# Patient Record
Sex: FEMALE | Race: WHITE
[De-identification: names, ages, dates, MRNs, and addresses within clinical notes are randomized per-mention and may not be internally consistent; named-entity substitution may affect disease eponyms.]

---

## 2019-09-04 ENCOUNTER — HOSPITAL ENCOUNTER (EMERGENCY)
Dept: HOSPITAL 59 - ER | Age: 44
Discharge: HOME | End: 2019-09-04
Payer: SELF-PAY
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
## 2019-09-04 DIAGNOSIS — S61.214A: Primary | ICD-10-CM

## 2019-09-04 PROCEDURE — 90715 TDAP VACCINE 7 YRS/> IM: CPT

## 2019-09-04 PROCEDURE — 73140 X-RAY EXAM OF FINGER(S): CPT

## 2019-09-04 PROCEDURE — 96372 THER/PROPH/DIAG INJ SC/IM: CPT

## 2019-09-04 PROCEDURE — 99283 EMERGENCY DEPT VISIT LOW MDM: CPT

## 2019-09-04 NOTE — EMERGENCY DEPARTMENT RECORD
History of Present Illness





- General


Chief Complaint: Animal Bite


Stated Complaint: DOG BITE RING FINGER/WC


Time Seen by Provider: 09/04/19 19:32


Source: Patient


Mode of Arrival: Ambulatory


Limitations: No limitations





- History of Present Illness


Initial Comments: 





Pt is right hand dominate with dog bite to ring finger of right hand at work as 

.  Last Td is unknown.  Small laceration to pad of right ring 

finger. 


No other injury.  


Onset/Timing: 3


-: Hour(s)


Location - General: Other


Animal: Dog


Description: Household pet


Mechanism: Bite


Pain Description: Constant


Context: Other


Associated Symptoms: Bleeding, Discharge from wound


Treatments Prior to Arrival: Wound dressing(s)





- Related Data


                                  Previous Rx's











 Medication  Instructions  Recorded


 


Cephalexin [Keflex] 1,000 mg PO BID 5 Days #20 cap 09/04/19











                                    Allergies











Allergy/AdvReac Type Severity Reaction Status Date / Time


 


amoxicillin Allergy  PT UNSURE Verified 10/02/15 11:25





   OF REACTION  


 


clarithromycin [From Biaxin] Allergy  RASH Verified 10/02/15 11:25


 


erythromycin base Allergy  PT UNSURE Verified 10/02/15 11:25





   OF REACTION  


 


Penicillins Allergy  PT UNSURE Verified 10/02/15 11:25





   OF REACTION  


 


sulfamethoxazole Allergy  HIVES Verified 09/04/19 19:59





[From Bactrim]     


 


trimethoprim [From Bactrim] Allergy  HIVES Verified 09/04/19 19:59














Travel Screening





- Travel/Exposure Within Last 30 Days


Have you traveled within the last 30 days?: No





- Travel/Exposure Within Last Year


Have you traveled outside the [place] in the last year?: No





- Additonal Travel Details


Have you been exposed to anyone with a communicable illness?: No





Review of Systems


Constitutional: Denies: Chills, Fever


Eyes: Denies: Eye discharge


ENT: Denies: Congestion


Respiratory: Denies: Cough


Cardiovascular: Denies: Arrhythmia


Endocrine: Denies: Fatigue


Gastrointestinal: Denies: Abdominal pain





Past Medical History





- SOCIAL HISTORY


Smoking Status: Never smoker


Alcohol Use: Occasional


Drug Use: None





Family Medical History


Any Significant Family History?: No





Physical Exam





- General


General Appearance: Alert, Oriented x3, Cooperative





- Head


Head exam: Atraumatic, Normal inspection





- Eye


Eye exam: Normal appearance, PERRL





- ENT


ENT exam: Normal exam, Mucous membranes moist





- Neck


Neck exam: Normal inspection





- Respiratory


Respiratory exam: Normal lung sounds bilaterally.  negative: Respiratory 

distress





- Cardiovascular


Cardiovascular Exam: Regular rate, Normal rhythm.  negative: Tachycardia





- Extremities


Extremities exam: Tenderness (right ring finger pad with 2mm longitudinal lac 

into subcut tissue.  Full ROM)





- Neurological


Neurological exam: Alert, Normal gait, Oriented X3





- Psychiatric


Psychiatric exam: Normal affect, Normal mood





- Skin


Skin exam: Normal color





Course





                                   Vital Signs











  09/04/19





  19:15


 


Temperature 98.3 F


 


Pulse Rate 72


 


Respiratory 18





Rate 


 


Blood Pressure 138/96


 


Pulse Ox 98














- Reevaluation(s)


Reevaluation #1: 





09/04/19 20:04


XR neg, washed in sink.  Dressed.  [place] and close follow up,





Disposition


Disposition: Discharge


Clinical Impression: 


 Dog bite of finger





Condition: (1) Good


Instructions:  Animal Bite (ED)


Prescriptions: 


Cephalexin [Keflex] 1,000 mg PO BID 5 Days #20 cap


Forms:  Patient Portal Access


Time of Disposition: 20:06





Quality





- Quality Measures


Quality Measures: N/A





- Blood Pressure Screening


Does Patient Have Any of the Following: No


Blood Pressure Classification: Hypertensive Reading


Systolic Measurement: 138


Diastolic Measurement: 96


Screening for High Blood Pressure: < Normal BP, F/U Not Required > []


Pre-Hypertensive Follow-up Interventions: Follow-up with rescreen every year.

## 2019-09-06 NOTE — RADIOLOGY REPORT
EXAM:  FINGER(S), RIGHT



HISTORY:  RIGHT RING FINGER DOG BITE.



COMPARISON:  None.



TECHNIQUE:  Three views of the right ring finger.



FINDINGS:  There is no bone or joint abnormality. No radiopaque foreign body. 



IMPRESSION:  

NO FRACTURE OR RADIOPAQUE FOREIGN BODY.



JOB NUMBER:  [id number]

MTDD